# Patient Record
Sex: MALE | Race: WHITE | Employment: FULL TIME | URBAN - METROPOLITAN AREA
[De-identification: names, ages, dates, MRNs, and addresses within clinical notes are randomized per-mention and may not be internally consistent; named-entity substitution may affect disease eponyms.]

---

## 2017-01-17 ENCOUNTER — HOSPITAL ENCOUNTER (OUTPATIENT)
Dept: LAB | Age: 27
Discharge: HOME | End: 2017-01-17

## 2017-01-17 PROCEDURE — 88305 TISSUE EXAM BY PATHOLOGIST: CPT | Performed by: OTOLARYNGOLOGY

## 2017-01-18 ENCOUNTER — ANESTHESIA (OUTPATIENT)
Dept: SURGERY | Age: 27
End: 2017-01-18
Payer: COMMERCIAL

## 2017-01-18 ENCOUNTER — HOSPITAL ENCOUNTER (OUTPATIENT)
Age: 27
Setting detail: OUTPATIENT SURGERY
Discharge: HOME | End: 2017-01-18
Attending: OTOLARYNGOLOGY | Admitting: OTOLARYNGOLOGY
Payer: COMMERCIAL

## 2017-01-18 ENCOUNTER — ANESTHESIA EVENT (OUTPATIENT)
Dept: SURGERY | Age: 27
End: 2017-01-18
Payer: COMMERCIAL

## 2017-01-18 VITALS
OXYGEN SATURATION: 92 % | BODY MASS INDEX: 34.34 KG/M2 | HEART RATE: 111 BPM | TEMPERATURE: 97.9 F | SYSTOLIC BLOOD PRESSURE: 128 MMHG | DIASTOLIC BLOOD PRESSURE: 66 MMHG | HEIGHT: 68 IN | RESPIRATION RATE: 16 BRPM | WEIGHT: 226.56 LBS

## 2017-01-18 LAB — HGB BLD-MCNC: 13.2 G/DL (ref 13.6–17.2)

## 2017-01-18 PROCEDURE — 74011000250 HC RX REV CODE- 250

## 2017-01-18 PROCEDURE — 76060000032 HC ANESTHESIA 0.5 TO 1 HR: Performed by: OTOLARYNGOLOGY

## 2017-01-18 PROCEDURE — 74011250636 HC RX REV CODE- 250/636: Performed by: ANESTHESIOLOGY

## 2017-01-18 PROCEDURE — 74011250636 HC RX REV CODE- 250/636

## 2017-01-18 PROCEDURE — 76210000020 HC REC RM PH II FIRST 0.5 HR: Performed by: OTOLARYNGOLOGY

## 2017-01-18 PROCEDURE — 77030008703 HC TU ET UNCUF COVD -A: Performed by: ANESTHESIOLOGY

## 2017-01-18 PROCEDURE — 76210000016 HC OR PH I REC 1 TO 1.5 HR: Performed by: OTOLARYNGOLOGY

## 2017-01-18 PROCEDURE — 85018 HEMOGLOBIN: CPT | Performed by: ANESTHESIOLOGY

## 2017-01-18 PROCEDURE — 76010000160 HC OR TIME 0.5 TO 1 HR INTENSV-TIER 1: Performed by: OTOLARYNGOLOGY

## 2017-01-18 PROCEDURE — 74011000250 HC RX REV CODE- 250: Performed by: ANESTHESIOLOGY

## 2017-01-18 PROCEDURE — 77030012840 HC ELECTRD COAG SUC CNMD -C: Performed by: OTOLARYNGOLOGY

## 2017-01-18 PROCEDURE — 77030008477 HC STYL SATN SLP COVD -A: Performed by: ANESTHESIOLOGY

## 2017-01-18 PROCEDURE — 77030008771 HC TU NG SALEM SUMP -A: Performed by: ANESTHESIOLOGY

## 2017-01-18 RX ORDER — METOPROLOL TARTRATE 5 MG/5ML
INJECTION INTRAVENOUS AS NEEDED
Status: DISCONTINUED | OUTPATIENT
Start: 2017-01-18 | End: 2017-01-18 | Stop reason: HOSPADM

## 2017-01-18 RX ORDER — FENTANYL CITRATE 50 UG/ML
INJECTION, SOLUTION INTRAMUSCULAR; INTRAVENOUS AS NEEDED
Status: DISCONTINUED | OUTPATIENT
Start: 2017-01-18 | End: 2017-01-18 | Stop reason: HOSPADM

## 2017-01-18 RX ORDER — HYDROMORPHONE HYDROCHLORIDE 2 MG/ML
0.5 INJECTION, SOLUTION INTRAMUSCULAR; INTRAVENOUS; SUBCUTANEOUS
Status: DISCONTINUED | OUTPATIENT
Start: 2017-01-18 | End: 2017-01-18 | Stop reason: HOSPADM

## 2017-01-18 RX ORDER — OXYCODONE HYDROCHLORIDE 5 MG/1
5 TABLET ORAL
Status: DISCONTINUED | OUTPATIENT
Start: 2017-01-18 | End: 2017-01-18 | Stop reason: HOSPADM

## 2017-01-18 RX ORDER — NALOXONE HYDROCHLORIDE 0.4 MG/ML
0.1 INJECTION, SOLUTION INTRAMUSCULAR; INTRAVENOUS; SUBCUTANEOUS
Status: DISCONTINUED | OUTPATIENT
Start: 2017-01-18 | End: 2017-01-18 | Stop reason: HOSPADM

## 2017-01-18 RX ORDER — ROCURONIUM BROMIDE 10 MG/ML
INJECTION, SOLUTION INTRAVENOUS AS NEEDED
Status: DISCONTINUED | OUTPATIENT
Start: 2017-01-18 | End: 2017-01-18 | Stop reason: HOSPADM

## 2017-01-18 RX ORDER — DEXAMETHASONE SODIUM PHOSPHATE 4 MG/ML
INJECTION, SOLUTION INTRA-ARTICULAR; INTRALESIONAL; INTRAMUSCULAR; INTRAVENOUS; SOFT TISSUE AS NEEDED
Status: DISCONTINUED | OUTPATIENT
Start: 2017-01-18 | End: 2017-01-18 | Stop reason: HOSPADM

## 2017-01-18 RX ORDER — SUCCINYLCHOLINE CHLORIDE 20 MG/ML
INJECTION INTRAMUSCULAR; INTRAVENOUS AS NEEDED
Status: DISCONTINUED | OUTPATIENT
Start: 2017-01-18 | End: 2017-01-18 | Stop reason: HOSPADM

## 2017-01-18 RX ORDER — SODIUM CHLORIDE, SODIUM LACTATE, POTASSIUM CHLORIDE, CALCIUM CHLORIDE 600; 310; 30; 20 MG/100ML; MG/100ML; MG/100ML; MG/100ML
75 INJECTION, SOLUTION INTRAVENOUS CONTINUOUS
Status: DISCONTINUED | OUTPATIENT
Start: 2017-01-18 | End: 2017-01-18 | Stop reason: HOSPADM

## 2017-01-18 RX ORDER — DIPHENHYDRAMINE HYDROCHLORIDE 50 MG/ML
12.5 INJECTION, SOLUTION INTRAMUSCULAR; INTRAVENOUS
Status: DISCONTINUED | OUTPATIENT
Start: 2017-01-18 | End: 2017-01-18 | Stop reason: HOSPADM

## 2017-01-18 RX ORDER — PROPOFOL 10 MG/ML
INJECTION, EMULSION INTRAVENOUS AS NEEDED
Status: DISCONTINUED | OUTPATIENT
Start: 2017-01-18 | End: 2017-01-18 | Stop reason: HOSPADM

## 2017-01-18 RX ORDER — FLUMAZENIL 0.1 MG/ML
0.2 INJECTION INTRAVENOUS AS NEEDED
Status: DISCONTINUED | OUTPATIENT
Start: 2017-01-18 | End: 2017-01-18 | Stop reason: HOSPADM

## 2017-01-18 RX ORDER — FAMOTIDINE 10 MG/ML
20 INJECTION INTRAVENOUS ONCE
Status: COMPLETED | OUTPATIENT
Start: 2017-01-18 | End: 2017-01-18

## 2017-01-18 RX ORDER — MIDAZOLAM HYDROCHLORIDE 1 MG/ML
2 INJECTION, SOLUTION INTRAMUSCULAR; INTRAVENOUS
Status: COMPLETED | OUTPATIENT
Start: 2017-01-18 | End: 2017-01-18

## 2017-01-18 RX ORDER — OXYCODONE HYDROCHLORIDE 5 MG/1
10 TABLET ORAL
Status: DISCONTINUED | OUTPATIENT
Start: 2017-01-18 | End: 2017-01-18 | Stop reason: HOSPADM

## 2017-01-18 RX ORDER — LIDOCAINE HYDROCHLORIDE 20 MG/ML
INJECTION, SOLUTION EPIDURAL; INFILTRATION; INTRACAUDAL; PERINEURAL AS NEEDED
Status: DISCONTINUED | OUTPATIENT
Start: 2017-01-18 | End: 2017-01-18 | Stop reason: HOSPADM

## 2017-01-18 RX ORDER — ESMOLOL HYDROCHLORIDE 10 MG/ML
INJECTION INTRAVENOUS AS NEEDED
Status: DISCONTINUED | OUTPATIENT
Start: 2017-01-18 | End: 2017-01-18 | Stop reason: HOSPADM

## 2017-01-18 RX ORDER — LIDOCAINE HYDROCHLORIDE 10 MG/ML
0.1 INJECTION INFILTRATION; PERINEURAL AS NEEDED
Status: DISCONTINUED | OUTPATIENT
Start: 2017-01-18 | End: 2017-01-18 | Stop reason: HOSPADM

## 2017-01-18 RX ADMIN — METOPROLOL TARTRATE 3 MG: 5 INJECTION INTRAVENOUS at 08:42

## 2017-01-18 RX ADMIN — ESMOLOL HYDROCHLORIDE 10 MG: 10 INJECTION INTRAVENOUS at 08:25

## 2017-01-18 RX ADMIN — FENTANYL CITRATE 50 MCG: 50 INJECTION, SOLUTION INTRAMUSCULAR; INTRAVENOUS at 08:15

## 2017-01-18 RX ADMIN — MIDAZOLAM HYDROCHLORIDE 2 MG: 1 INJECTION, SOLUTION INTRAMUSCULAR; INTRAVENOUS at 07:59

## 2017-01-18 RX ADMIN — SODIUM CHLORIDE, SODIUM LACTATE, POTASSIUM CHLORIDE, AND CALCIUM CHLORIDE: 600; 310; 30; 20 INJECTION, SOLUTION INTRAVENOUS at 08:28

## 2017-01-18 RX ADMIN — LIDOCAINE HYDROCHLORIDE 0.1 ML: 10 INJECTION, SOLUTION INFILTRATION; PERINEURAL at 07:46

## 2017-01-18 RX ADMIN — PROPOFOL 100 MG: 10 INJECTION, EMULSION INTRAVENOUS at 08:15

## 2017-01-18 RX ADMIN — METOPROLOL TARTRATE 2 MG: 5 INJECTION INTRAVENOUS at 08:48

## 2017-01-18 RX ADMIN — HYDROMORPHONE HYDROCHLORIDE 0.5 MG: 2 INJECTION, SOLUTION INTRAMUSCULAR; INTRAVENOUS; SUBCUTANEOUS at 09:14

## 2017-01-18 RX ADMIN — SODIUM CHLORIDE, SODIUM LACTATE, POTASSIUM CHLORIDE, AND CALCIUM CHLORIDE 1000 ML: 600; 310; 30; 20 INJECTION, SOLUTION INTRAVENOUS at 07:46

## 2017-01-18 RX ADMIN — DEXAMETHASONE SODIUM PHOSPHATE 10 MG: 4 INJECTION, SOLUTION INTRA-ARTICULAR; INTRALESIONAL; INTRAMUSCULAR; INTRAVENOUS; SOFT TISSUE at 08:10

## 2017-01-18 RX ADMIN — ROCURONIUM BROMIDE 5 MG: 10 INJECTION, SOLUTION INTRAVENOUS at 08:09

## 2017-01-18 RX ADMIN — ESMOLOL HYDROCHLORIDE 20 MG: 10 INJECTION INTRAVENOUS at 08:24

## 2017-01-18 RX ADMIN — FAMOTIDINE 20 MG: 10 INJECTION, SOLUTION INTRAVENOUS at 07:58

## 2017-01-18 RX ADMIN — LIDOCAINE HYDROCHLORIDE 100 MG: 20 INJECTION, SOLUTION EPIDURAL; INFILTRATION; INTRACAUDAL; PERINEURAL at 08:09

## 2017-01-18 RX ADMIN — HYDROMORPHONE HYDROCHLORIDE 0.5 MG: 2 INJECTION, SOLUTION INTRAMUSCULAR; INTRAVENOUS; SUBCUTANEOUS at 09:05

## 2017-01-18 RX ADMIN — SODIUM CHLORIDE, SODIUM LACTATE, POTASSIUM CHLORIDE, AND CALCIUM CHLORIDE: 600; 310; 30; 20 INJECTION, SOLUTION INTRAVENOUS at 08:00

## 2017-01-18 RX ADMIN — FENTANYL CITRATE 100 MCG: 50 INJECTION, SOLUTION INTRAMUSCULAR; INTRAVENOUS at 08:05

## 2017-01-18 RX ADMIN — SUCCINYLCHOLINE CHLORIDE 180 MG: 20 INJECTION INTRAMUSCULAR; INTRAVENOUS at 08:09

## 2017-01-18 RX ADMIN — PROPOFOL 200 MG: 10 INJECTION, EMULSION INTRAVENOUS at 08:09

## 2017-01-18 NOTE — H&P
27 yo male pt of Dr. Shaka Oakley who underwent tonsillectomy at [facility] yesterday. Did well until this morning when he started coughing and there was perfuse bleeding afterwards. They called me around 0630 and I directed them directly to [facility] for control of tonsil bleed. Past Medical History   Diagnosis Date    FH: CAD (coronary artery disease)     GERD (gastroesophageal reflux disease)     HTN (hypertension) 10/31/2014    Hypercholesteremia     Hypertension     Sinus problem      PSurgHxL Tonsillectomy    Social History     Social History    Marital status:      Spouse name: N/A    Number of children: N/A    Years of education: N/A     Occupational History    Not on file. Social History Main Topics    Smoking status: Never Smoker    Smokeless tobacco: Never Used    Alcohol use No    Drug use: No    Sexual activity: Not on file     Other Topics Concern    Not on file     Social History Narrative     Current Facility-Administered Medications   Medication Dose Route Frequency    lidocaine (XYLOCAINE) 10 mg/mL (1 %) injection 0.1 mL  0.1 mL SubCUTAneous PRN    lactated ringers infusion  75 mL/hr IntraVENous CONTINUOUS    midazolam (VERSED) injection 2 mg  2 mg IntraVENous ONCE PRN    lactated ringers bolus infusion 1,000 mL  1,000 mL IntraVENous ONCE    famotidine (PF) (PEPCID) injection 20 mg  20 mg IntraVENous ONCE     EXAM:  Visit Vitals    BP (!) 132/3 (BP 1 Location: Right arm, BP Patient Position: At rest;Supine)    Pulse (!) 104    Temp 97.6 °F (36.4 °C)    Resp 16    Ht 5' 8\" (1.727 m)    Wt 102.8 kg (226 lb 9 oz)    SpO2 96%    BMI 34.45 kg/m2     -NAD, well-appearing  -Normal gait  -Neck soft, supple, no LAD, no neck masses  -Blood clot sitting in back of oropharynx  -No stridor/wheezing    A/P: Acute tonsillar hemorrhage s/p tonsillectomy yesterday- recommend taking him back urgently to OR for control of this tonsillar bleed.  Discussed all risks of surgery and they would like to proceed.      Sam Shahid MD

## 2017-01-18 NOTE — IP AVS SNAPSHOT
Current Discharge Medication List  
  
Take these medications at their scheduled times Dose & Instructions Dispensing Information Comments Morning Noon Evening Bedtime CLARITIN 10 mg tablet Generic drug:  loratadine Your next dose is: Today, Tomorrow Other:  ____________ Dose:  10 mg Take 10 mg by mouth daily. Refills:  0  
     
   
   
   
  
 lisinopril-hydroCHLOROthiazide 20-12.5 mg per tablet Commonly known as:  Fertile Risen Your next dose is: Today, Tomorrow Other:  ____________ Dose:  1 Tab Take 1 Tab by mouth daily. Quantity:  90 Tab Refills:  1  
     
   
   
   
  
 methylPREDNISolone 4 mg tablet Commonly known as:  MEDROL (RENU) Your next dose is: Today, Tomorrow Other:  ____________ Dose:  4 mg Take 1 Tab by mouth Specific Days and Specific Times. Take 1 tablet every day by mouth in the morning after breakfast until pack is completed. Quantity:  1 Dose Pack Refills:  0  
     
   
   
   
  
 omeprazole 40 mg capsule Commonly known as:  PRILOSEC Your next dose is: Today, Tomorrow Other:  ____________ Dose:  40 mg Take 1 Cap by mouth daily. Quantity:  90 Cap Refills:  1  
     
   
   
   
  
 predniSONE 20 mg tablet Commonly known as:  Yulia Chill Your next dose is: Today, Tomorrow Other:  ____________ Dose:  20 mg Take 1 Tab by mouth daily (with breakfast). Quantity:  21 Tab Refills:  0 Take these medications as needed Dose & Instructions Dispensing Information Comments Morning Noon Evening Bedtime * ondansetron 4 mg disintegrating tablet Commonly known as:  ZOFRAN ODT Your next dose is: Today, Tomorrow Other:  ____________ Dose:  4 mg Take 1 Tab by mouth every eight (8) hours as needed for Nausea. Quantity:  20 Tab Refills:  0 oxyCODONE-acetaminophen 5-325 mg per tablet Commonly known as:  PERCOCET Your next dose is: Today, Tomorrow Other:  ____________ Dose:  1 Tab Take 1 Tab by mouth every four (4) hours as needed for Pain. Max Daily Amount: 6 Tabs. Quantity:  10 Tab Refills:  0  
     
   
   
   
  
 * Notice: This list has 1 medication(s) that are the same as other medications prescribed for you. Read the directions carefully, and ask your doctor or other care provider to review them with you. Take these medications as directed Dose & Instructions Dispensing Information Comments Morning Noon Evening Bedtime HYDROcodone-acetaminophen 0.5-21.7 mg/mL oral solution Commonly known as:  HYCET Your next dose is: Today, Tomorrow Other:  ____________  
   
   
 2-4 tsp q 4-6 hours prn pain mild to moderate  Indications: Dispense 8 ounces Quantity:  8 Bottle Refills:  0  
     
   
   
   
  
 * ondansetron 8 mg disintegrating tablet Commonly known as:  ZOFRAN ODT Your next dose is: Today, Tomorrow Other:  ____________  
   
   
 1 q 4-6 hour prn for po nausea  Indications: PREVENTION OF POST-OPERATIVE NAUSEA AND VOMITING Quantity:  8 Tab Refills:  0  
     
   
   
   
  
 oxyCODONE 5 mg/5 mL solution Commonly known as:  Radha Canal Your next dose is: Today, Tomorrow Other:  ____________  
   
   
 1-2 tsp q 4-6 hours prn severe po pain  Indications: dispence 8 ounces Quantity:  8 Bottle Refills:  0  
     
   
   
   
  
 * Notice: This list has 1 medication(s) that are the same as other medications prescribed for you. Read the directions carefully, and ask your doctor or other care provider to review them with you.

## 2017-01-18 NOTE — DISCHARGE INSTRUCTIONS
Tonsillectomy: What to Expect at Home  Your Recovery  A tonsillectomy is surgery to remove the tonsils. Sometimes the adenoids are removed during the same surgery. The tonsils and adenoids are in the throat. Your doctor did the surgery through your mouth. Most adults have a lot of throat pain for 1 to 2 weeks or longer. The pain may get worse before it gets better. The pain in your throat can also make your ears hurt. You may have good days and bad days. Most people find that they have the most pain in the first 8 days. You probably will feel tired for 1 to 2 weeks. You may have bad breath for up to 2 weeks. You may be able to go back to work or your usual routine in 1 to 2 weeks. There will be a white coating in your throat where the tonsils were. The coating is like a scab, and it usually starts to come off in 5 to 10 days. It is usually gone in 10 to 16 days. You may see some blood in your spit as the coating comes off. After surgery, you may snore or breathe through your mouth at night. This usually gets better 1 to 2 weeks after surgery. Mouth breathing can make your mouth and throat dry or sore. Place a humidifier by your bed when you sleep. This may make it easier for you to breathe. Follow the directions for cleaning the machine. At first, your voice may sound different. Your voice probably will return to normal in 2 to 6 weeks. It is common for people to lose weight after this surgery, because it can hurt to swallow food at first. As long as you are drinking plenty of liquids, this is okay. You will probably gain the weight back when you begin to eat normally again. This care sheet gives you a general idea about how long it will take for you to recover. But each person recovers at a different pace. Follow the steps below to get better as quickly as possible. How can you care for yourself at home? Activity  · Rest when you feel tired. Getting enough sleep will help you recover.   · Try to walk each day. Start by walking a little more than you did the day before. Bit by bit, increase the amount you walk. Walking boosts blood flow and helps prevent pneumonia and constipation. · Avoid strenuous activities, such as bicycle riding, jogging, weight lifting, or aerobic exercise, for 2 weeks or until your doctor says it is okay. · For 2 weeks, avoid lifting anything that would make you strain. This may include a child, heavy grocery bags and milk containers, a heavy briefcase or backpack, cat litter or dog food bags, or a vacuum . · Avoid dirt, dust, and heat for 2 weeks after surgery. These things can irritate your throat. · For about 1 week, try to avoid crowds or people who you know have a cold or the flu. This can help prevent you from getting an infection. · You may bathe as usual.  · Ask your doctor when you can drive again. · You will probably need to take 1 to 2 weeks off from work. It depends on the type of work you do and how you feel. Diet  · Drink plenty of fluids to avoid becoming dehydrated. · If it is painful to swallow, start out with Popsicles, ice cream, or cold or room-temperature drinks. Do not eat or drink red food items, such as red juice or red Jell-O. The color may make you think you are bleeding. Avoid hot drinks, soda pop, orange or tomato juice, and other acidic foods that can sting the throat. These may make throat pain worse and cause bleeding. · For 2 weeks, choose soft foods like pudding, yogurt, canned or cooked fruit, scrambled eggs, and mashed potatoes. Avoid eating hard or scratchy foods like chips or raw vegetables. · You may notice that your bowel movements are not regular right after your surgery. This is common. Try to avoid constipation and straining with bowel movements. You may want to take a fiber supplement every day. If you have not had a bowel movement after a couple of days, ask your doctor about taking a mild laxative.   Medicines  · Your doctor will tell you if and when you can restart your medicines. He or she will also give you instructions about taking any new medicines. · If you take blood thinners, such as warfarin (Coumadin), clopidogrel (Plavix), or aspirin, be sure to talk to your doctor. He or she will tell you if and when to start taking those medicines again. Make sure that you understand exactly what your doctor wants you to do. · Be safe with medicines. Take pain medicines exactly as directed. ¨ If the doctor gave you a prescription medicine for pain, take it as prescribed. ¨ If you are not taking a prescription pain medicine, ask your doctor if you can take an over-the-counter medicine. · If you think your pain medicine is making you sick to your stomach:  ¨ Take your pain medicine after meals (unless your doctor has told you not to). ¨ Ask your doctor for a different pain medicine. · If your doctor prescribed antibiotics, take them as directed. Do not stop taking them just because you feel better. You need to take the full course of antibiotics. Follow-up care is a key part of your treatment and safety. Be sure to make and go to all appointments, and call your doctor if you are having problems. It's also a good idea to know your test results and keep a list of the medicines you take. When should you call for help? Call 911 anytime you think you may need emergency care. For example, call if:  · You passed out (lost consciousness). · You have severe trouble breathing. Call your doctor now or seek immediate medical care if:  · You bleed from your mouth or nose. · You vomit blood. · You are sick to your stomach or cannot keep fluids down. · You have pain that does not get better after you take pain medicine. · You have a fever. · Your pain gets much worse. · You have signs of needing more fluids. You have sunken eyes and a dry mouth, and you pass only a little dark urine.   Watch closely for changes in your health, and be sure to contact your doctor if you have any problems. Where can you learn more? Go to http://anneliese-sadia.info/. Enter N114 in the search box to learn more about \"Tonsillectomy: What to Expect at Home. \"  Current as of: July 29, 2016  Content Version: 11.1  © 4414-0631 [employer]. Care instructions adapted under license by [employer] (which disclaims liability or warranty for this information). If you have questions about a medical condition or this instruction, always ask your healthcare professional. [address] any warranty or liability for your use of this information.

## 2017-01-18 NOTE — IP AVS SNAPSHOT
[address] 
[phone] Patient: Flip Pacheco. MRN: [id number] You are allergic to the following No active allergies Recent Documentation Height Weight BMI Smoking Status 1.727 m 102.8 kg 34.45 kg/m2 Never Smoker Emergency Contacts Name Discharge Info Relation Home Work Mobile [address] CAREGIVER [3] Spouse [3] [phone] Dameon Camacho CAREGIVER [3] Father [15] [phone] About your hospitalization You were admitted on:  January 18, 2017 You last received care in the:  [facility] PACU You were discharged on:  January 18, 2017 Unit phone number:  [phone] Why you were hospitalized Your primary diagnosis was:  Not on File Providers Seen During Your Hospitalizations Provider Role Specialty Primary office phone Leopold Real, MD Attending Provider Otolaryngology [phone] Your Primary Care Physician (PCP) Primary Care Physician Office Phone Office Fax Melissa Penaloza 65 [phone] Follow-up Information Follow up With Details Comments Contact Info Yaa Jennings NP   [address] 
[phone] Leopold Real, MD Schedule an appointment as soon as possible for a visit in 1 week(s)  [address] [address] 
[phone] Your Appointments Wednesday January 25, 2017  4:05 PM EST  
POST OP with A Waqas Mondragon MD  
[facility] [address] [place] [address] - [facility]) [address] [place] [address]  
[phone] Thursday February 16, 2017  8:50 AM EST Specimen with Reginald Tomlinson MD  
[facility] HT (U [facility]) [address] [place] [address] Jocelin Boyce  
[phone] Current Discharge Medication List  
  
CONTINUE these medications which have NOT CHANGED Dose & Instructions Dispensing Information Comments Morning Noon Evening Bedtime CLARITIN 10 mg tablet Generic drug:  loratadine Your next dose is: Today, Tomorrow Other:  _________ Dose:  10 mg Take 10 mg by mouth daily. Refills:  0 HYDROcodone-acetaminophen 0.5-21.7 mg/mL oral solution Commonly known as:  HYCET Your next dose is: Today, Tomorrow Other:  _________  
   
   
 2-4 tsp q 4-6 hours prn pain mild to moderate  Indications: Dispense 8 ounces Quantity:  8 Bottle Refills:  0  
     
   
   
   
  
 lisinopril-hydroCHLOROthiazide 20-12.5 mg per tablet Commonly known as:  Freedom Adan Your next dose is: Today, Tomorrow Other:  _________ Dose:  1 Tab Take 1 Tab by mouth daily. Quantity:  90 Tab Refills:  1  
     
   
   
   
  
 methylPREDNISolone 4 mg tablet Commonly known as:  MEDROL (RENU) Your next dose is: Today, Tomorrow Other:  _________ Dose:  4 mg Take 1 Tab by mouth Specific Days and Specific Times. Take 1 tablet every day by mouth in the morning after breakfast until pack is completed. Quantity:  1 Dose Pack Refills:  0  
     
   
   
   
  
 omeprazole 40 mg capsule Commonly known as:  PRILOSEC Your next dose is: Today, Tomorrow Other:  _________ Dose:  40 mg Take 1 Cap by mouth daily. Quantity:  90 Cap Refills:  1  
     
   
   
   
  
 * ondansetron 4 mg disintegrating tablet Commonly known as:  ZOFRAN ODT Your next dose is: Today, Tomorrow Other:  _________ Dose:  4 mg Take 1 Tab by mouth every eight (8) hours as needed for Nausea. Quantity:  20 Tab Refills:  0  
     
   
   
   
  
 * ondansetron 8 mg disintegrating tablet Commonly known as:  ZOFRAN ODT  
   
 Your next dose is: Today, Tomorrow Other:  _________  
   
   
 1 q 4-6 hour prn for po nausea  Indications: PREVENTION OF POST-OPERATIVE NAUSEA AND VOMITING Quantity:  8 Tab Refills:  0  
     
   
   
   
  
 oxyCODONE 5 mg/5 mL solution Commonly known as:  Lynkirit Melissaen Your next dose is: Today, Tomorrow Other:  _________  
   
   
 1-2 tsp q 4-6 hours prn severe po pain  Indications: dispence 8 ounces Quantity:  8 Bottle Refills:  0  
     
   
   
   
  
 oxyCODONE-acetaminophen 5-325 mg per tablet Commonly known as:  PERCOCET Your next dose is: Today, Tomorrow Other:  _________ Dose:  1 Tab Take 1 Tab by mouth every four (4) hours as needed for Pain. Max Daily Amount: 6 Tabs. Quantity:  10 Tab Refills:  0  
     
   
   
   
  
 predniSONE 20 mg tablet Commonly known as:  Nanine Knife Your next dose is: Today, Tomorrow Other:  _________ Dose:  20 mg Take 1 Tab by mouth daily (with breakfast). Quantity:  21 Tab Refills:  0  
     
   
   
   
  
 * Notice: This list has 2 medication(s) that are the same as other medications prescribed for you. Read the directions carefully, and ask your doctor or other care provider to review them with you. Discharge Instructions Tonsillectomy: What to Expect at [facility] Your Recovery A tonsillectomy is surgery to remove the tonsils. Sometimes the adenoids are removed during the same surgery. The tonsils and adenoids are in the throat. Your doctor did the surgery through your mouth. Most adults have a lot of throat pain for 1 to 2 weeks or longer. The pain may get worse before it gets better. The pain in your throat can also make your ears hurt. You may have good days and bad days. Most people find that they have the most pain in the first 8 days. You probably will feel tired for 1 to 2 weeks. You may have bad breath for up to 2 weeks. You may be able to go back to work or your usual routine in 1 to 2 weeks. There will be a white coating in your throat where the tonsils were. The coating is like a scab, and it usually starts to come off in 5 to 10 days. It is usually gone in 10 to 16 days. You may see some blood in your spit as the coating comes off. After surgery, you may snore or breathe through your mouth at night. This usually gets better 1 to 2 weeks after surgery. Mouth breathing can make your mouth and throat dry or sore. Place a humidifier by your bed when you sleep. This may make it easier for you to breathe. Follow the directions for cleaning the machine. At first, your voice may sound different. Your voice probably will return to normal in 2 to 6 weeks. It is common for people to lose weight after this surgery, because it can hurt to swallow food at first. As long as you are drinking plenty of liquids, this is okay. You will probably gain the weight back when you begin to eat normally again. This care sheet gives you a general idea about how long it will take for you to recover. But each person recovers at a different pace. Follow the steps below to get better as quickly as possible. How can you care for yourself at home? Activity · Rest when you feel tired. Getting enough sleep will help you recover. · Try to walk each day. Start by walking a little more than you did the day before. Bit by bit, increase the amount you walk. Walking boosts blood flow and helps prevent pneumonia and constipation. · Avoid strenuous activities, such as bicycle riding, jogging, weight lifting, or aerobic exercise, for 2 weeks or until your doctor says it is okay. · For 2 weeks, avoid lifting anything that would make you strain. This may include a child, heavy grocery bags and milk containers, a heavy briefcase or backpack, cat litter or dog food bags, or a vacuum . · Avoid dirt, dust, and heat for 2 weeks after surgery.  These things can irritate your throat. · For about 1 week, try to avoid crowds or people who you know have a cold or the flu. This can help prevent you from getting an infection. · You may bathe as usual. 
· Ask your doctor when you can drive again. · You will probably need to take 1 to 2 weeks off from work. It depends on the type of work you do and how you feel. Diet · Drink plenty of fluids to avoid becoming dehydrated. · If it is painful to swallow, start out with Popsicles, ice cream, or cold or room-temperature drinks. Do not eat or drink red food items, such as red juice or red Jell-O. The color may make you think you are bleeding. Avoid hot drinks, soda pop, orange or tomato juice, and other acidic foods that can sting the throat. These may make throat pain worse and cause bleeding. · For 2 weeks, choose soft foods like pudding, yogurt, canned or cooked fruit, scrambled eggs, and mashed potatoes. Avoid eating hard or scratchy foods like chips or raw vegetables. · You may notice that your bowel movements are not regular right after your surgery. This is common. Try to avoid constipation and straining with bowel movements. You may want to take a fiber supplement every day. If you have not had a bowel movement after a couple of days, ask your doctor about taking a mild laxative. Medicines · Your doctor will tell you if and when you can restart your medicines. He or she will also give you instructions about taking any new medicines. · If you take blood thinners, such as warfarin (Coumadin), clopidogrel (Plavix), or aspirin, be sure to talk to your doctor. He or she will tell you if and when to start taking those medicines again. Make sure that you understand exactly what your doctor wants you to do. · Be safe with medicines. Take pain medicines exactly as directed. ¨ If the doctor gave you a prescription medicine for pain, take it as prescribed. ¨ If you are not taking a prescription pain medicine, ask your doctor if you can take an over-the-counter medicine. · If you think your pain medicine is making you sick to your stomach: 
¨ Take your pain medicine after meals (unless your doctor has told you not to). ¨ Ask your doctor for a different pain medicine. · If your doctor prescribed antibiotics, take them as directed. Do not stop taking them just because you feel better. You need to take the full course of antibiotics. Follow-up care is a key part of your treatment and safety. Be sure to make and go to all appointments, and call your doctor if you are having problems. It's also a good idea to know your test results and keep a list of the medicines you take. When should you call for help? Call 911 anytime you think you may need emergency care. For example, call if: 
· You passed out (lost consciousness). · You have severe trouble breathing. Call your doctor now or seek immediate medical care if: 
· You bleed from your mouth or nose. · You vomit blood. · You are sick to your stomach or cannot keep fluids down. · You have pain that does not get better after you take pain medicine. · You have a fever. · Your pain gets much worse. · You have signs of needing more fluids. You have sunken eyes and a dry mouth, and you pass only a little dark urine. Watch closely for changes in your health, and be sure to contact your doctor if you have any problems. Where can you learn more? Go to http://anneliese-sadia.info/. Enter A440 in the search box to learn more about \"Tonsillectomy: What to Expect at Home. \" Current as of: July 29, 2016 Content Version: 11.1 © 9828-9978 [employer]. Care instructions adapted under license by [employer] (which disclaims liability or warranty for this information).  If you have questions about a medical condition or this instruction, always ask your healthcare professional. [place] [address] any warranty or liability for your use of this information. Discharge Orders None Introducing [facility]! Chely Bunch introduces [employer] patient portal. Now you can access parts of your medical record, email your doctor's office, and request medication refills online. 1. In your internet browser, go to [email or web address] 2. Click on the First Time User? Click Here link in the Sign In box. You will see the New Member Sign Up page. 3. Enter your [employer] Access Code exactly as it appears below. You will not need to use this code after youve completed the sign-up process. If you do not sign up before the expiration date, you must request a new code. · [employer] Access Code: [email or web address] Expires: 4/18/2017  7:25 AM 
 
4. Enter the last four digits of your Social Security Number (xxxx) and Date of Birth (mm/dd/yyyy) as indicated and click Submit. You will be taken to the next sign-up page. 5. Create a [employer] ID. This will be your [employer] login ID and cannot be changed, so think of one that is secure and easy to remember. 6. Create a [employer] password. You can change your password at any time. 7. Enter your Password Reset Question and Answer. This can be used at a later time if you forget your password. 8. Enter your e-mail address. You will receive e-mail notification when new information is available in [address]. 9. Click Sign Up. You can now view and download portions of your medical record. 10. Click the Download Summary menu link to download a portable copy of your medical information. If you have questions, please visit the Frequently Asked Questions section of the [employer] website. Remember, [employer] is NOT to be used for urgent needs. For medical emergencies, dial 911. Now available from your iPhone and Android! General Information Please provide this summary of care documentation to your next provider. Patient Signature:  ____________________________________________________________ Date:  ____________________________________________________________  
  
Gwenyth Miles Provider Signature:  ____________________________________________________________ Date:  ____________________________________________________________

## 2017-01-18 NOTE — ANESTHESIA POSTPROCEDURE EVALUATION
Post-Anesthesia Evaluation and Assessment    Patient: Loida Curry. MRN: [id number]  SSN: [id number]    YOB: 1990  Age: 28 y.o. Sex: male       Cardiovascular Function/Vital Signs  Visit Vitals    /81    Pulse (!) 109    Temp 36.6 °C (97.9 °F)    Resp 16    Ht 5' 8\" (1.727 m)    Wt 102.8 kg (226 lb 9 oz)    SpO2 98%    BMI 34.45 kg/m2       Patient is status post No value filed. anesthesia for Procedure(s):  TONSILLAR HEMORRHAGE. Nausea/Vomiting: None    Postoperative hydration reviewed and adequate. Pain:  Pain Scale 1: Visual (01/18/17 0914)  Pain Intensity 1: 4 (01/18/17 0914)   Managed    Neurological Status:   Neuro (WDL): Exceptions to WDL (01/18/17 0850)  Neuro  Neurologic State: Drowsy; Eyes open to stimulus (01/18/17 0850)  Cognition: Follows commands (01/18/17 0850)  LUE Motor Response: Purposeful (01/18/17 0850)  LLE Motor Response: Purposeful (01/18/17 0850)  RUE Motor Response: Purposeful (01/18/17 0850)  RLE Motor Response: Purposeful (01/18/17 0850)   At baseline    Mental Status and Level of Consciousness: Arousable    Pulmonary Status:   O2 Device: 02 face tent (01/18/17 0920)   Adequate oxygenation and airway patent    Complications related to anesthesia: None    Post-anesthesia assessment completed.  No concerns    Signed By: Marlo Leal MD     January 18, 2017

## 2017-01-18 NOTE — ANESTHESIA PREPROCEDURE EVALUATION
Anesthetic History   No history of anesthetic complications            Review of Systems / Medical History  Patient summary reviewed, nursing notes reviewed and pertinent labs reviewed    Pulmonary  Within defined limits                 Neuro/Psych   Within defined limits           Cardiovascular    Hypertension: well controlled              Exercise tolerance: >4 METS     GI/Hepatic/Renal     GERD: well controlled           Endo/Other        Obesity     Other Findings            Physical Exam    Airway  Mallampati: II  TM Distance: 4 - 6 cm  Neck ROM: normal range of motion   Mouth opening: Normal     Cardiovascular    Rhythm: regular  Rate: normal         Dental  No notable dental hx       Pulmonary  Breath sounds clear to auscultation               Abdominal         Other Findings            Anesthetic Plan    ASA: 2, emergent  Anesthesia type: general            Anesthetic plan and risks discussed with: Patient and Spouse

## 2017-01-18 NOTE — BRIEF OP NOTE
BRIEF OPERATIVE NOTE    Date of Procedure: 1/18/2017   Preoperative Diagnosis: TONSIL BLEED    Postoperative Diagnosis: TONSIL BLEED    Procedure(s):  TONSILLAR HEMORRHAGE    Surgeon(s) and Role:     Prema Moreira MD - Primary    Surgical Staff:  Circ-1: Nasima March RN  Scrub Tech-1: Summer D Mil    Event Time In   Incision Start 5786   Incision Close      Anesthesia: General     Estimated Blood Loss: 40 cc    IVF: 900 cc    Specimens: * No specimens in log *     Findings: profuse bleeding from L inferior pole, R mid-tonsil     Complications: none    Implants: * No implants in log *

## 2017-01-18 NOTE — OP NOTES
[address]   OPERATIVE REPORT       Name:  Ebony Anderson   MR#:  [id number]   :  1990   Account #:  [de-identified]   Date of Adm:  2017       DATE OF SURGERY: 2017    PREOPERATIVE DIAGNOSIS: Acute tonsillar hemorrhage. POSTOPERATIVE DIAGNOSIS: Acute tonsillar hemorrhage. PROCEDURE: Control of tonsillar hemorrhage. OPERATIVE SURGEON: Polo Webber. Mihir Phelps MD.    ANESTHESIA: General endotracheal.    ANESTHESIOLOGIST: MONTSERRAT Eid MD    OPERATIVE FINDINGS: There was brisk bleeding from the right   inferior tonsillar pole as well as some mild bleeding from the   left mid tonsillar fossa. Hemostasis was ensured with suction   Bovie electrocautery and pressure. INTRAVENOUS FLUIDS: 900 mL crystalloid. ESTIMATED BLOOD LOSS:   40 cc    DRAINS: none    COMPLICATIONS: none    DISPOSITION: PACU, then home    INDICATIONS: Mr. Joya Hernandez is a patient of Dr. Kerrie Del Cid who underwent tonsillectomy yesterday at [facility]. He called this morning w/ acute bleeding from his throat and I directed them to go straight to hospital and I took him back urgently for control of this tonsillar bleed. DESCRIPTION OF PROCEDURE:  Patient was brought back to OR and placed on table in supine position. General anesthesia was inducted and then table was turned 90 degrees   counter clockwise. Crow-Braulio mouth-gag was placed. I used a copious sterile saline irrigation and suction to clean   all the blood clot out of the back of his oropharynx. On exam,   there was brisk bleeding coming from the right inferior   tonsillar pole. Pressure was applied from tonsil sponges and a   suction Bovie electrocautery to cauterize a blood vessel which   was bleeding right near the base of tongue. On the left side,   there was also some bleeding from the mid tonsillar fossa, which   was again controlled with suction Bovie electrocautery.  I   irrigated out the oral cavity and then briefly took him out of suspension and massaged his neck for a full 30 seconds to ensure   no further bleeding. Once hemostasis was ensured, I injected a   total of 7 mL of 0.5% Marcaine with 1:200,000 epinephrine along   both anterior and posterior tonsillar pillars. This concluded the surgical portion of the procedure. I   suctioned out the patient's stomach and he was awakened from   anesthesia and taken to the PACU in stable condition afterwards.         Mariama Albright MD      [facility]. / [place]   D:  01/18/2017   08:43   T:  01/18/2017   09:08   Job #:  [id number]

## 2017-06-09 ENCOUNTER — HOSPITAL ENCOUNTER (EMERGENCY)
Age: 27
Discharge: HOME | End: 2017-06-09
Attending: EMERGENCY MEDICINE
Payer: COMMERCIAL

## 2017-06-09 ENCOUNTER — APPOINTMENT (OUTPATIENT)
Dept: CT IMAGING | Age: 27
End: 2017-06-09
Attending: EMERGENCY MEDICINE
Payer: COMMERCIAL

## 2017-06-09 VITALS
SYSTOLIC BLOOD PRESSURE: 129 MMHG | WEIGHT: 220 LBS | TEMPERATURE: 98 F | HEIGHT: 68 IN | DIASTOLIC BLOOD PRESSURE: 79 MMHG | HEART RATE: 89 BPM | OXYGEN SATURATION: 99 % | RESPIRATION RATE: 16 BRPM | BODY MASS INDEX: 33.34 KG/M2

## 2017-06-09 DIAGNOSIS — R10.9 ACUTE ABDOMINAL PAIN: Primary | ICD-10-CM

## 2017-06-09 LAB
ANION GAP BLD CALC-SCNC: 13 MMOL/L (ref 7–16)
BASOPHILS # BLD AUTO: 0 K/UL (ref 0–0.2)
BASOPHILS # BLD: 0 % (ref 0–2)
BUN SERPL-MCNC: 16 MG/DL (ref 6–23)
CALCIUM SERPL-MCNC: 9.1 MG/DL (ref 8.3–10.4)
CHLORIDE SERPL-SCNC: 97 MMOL/L (ref 98–107)
CO2 SERPL-SCNC: 28 MMOL/L (ref 21–32)
CREAT SERPL-MCNC: 0.89 MG/DL (ref 0.8–1.5)
DIFFERENTIAL METHOD BLD: ABNORMAL
EOSINOPHIL # BLD: 0.1 K/UL (ref 0–0.8)
EOSINOPHIL NFR BLD: 1 % (ref 0.5–7.8)
ERYTHROCYTE [DISTWIDTH] IN BLOOD BY AUTOMATED COUNT: 15 % (ref 11.9–14.6)
GLUCOSE SERPL-MCNC: 97 MG/DL (ref 65–100)
HCT VFR BLD AUTO: 42.7 % (ref 41.1–50.3)
HGB BLD-MCNC: 13.7 G/DL (ref 13.6–17.2)
IMM GRANULOCYTES # BLD: 0 K/UL (ref 0–0.5)
IMM GRANULOCYTES NFR BLD AUTO: 0.3 % (ref 0–5)
LIPASE SERPL-CCNC: 100 U/L (ref 73–393)
LYMPHOCYTES # BLD AUTO: 43 % (ref 13–44)
LYMPHOCYTES # BLD: 4 K/UL (ref 0.5–4.6)
MCH RBC QN AUTO: 27.8 PG (ref 26.1–32.9)
MCHC RBC AUTO-ENTMCNC: 32.1 G/DL (ref 31.4–35)
MCV RBC AUTO: 86.8 FL (ref 79.6–97.8)
MONOCYTES # BLD: 0.9 K/UL (ref 0.1–1.3)
MONOCYTES NFR BLD AUTO: 9 % (ref 4–12)
NEUTS SEG # BLD: 4.3 K/UL (ref 1.7–8.2)
NEUTS SEG NFR BLD AUTO: 47 % (ref 43–78)
PLATELET # BLD AUTO: 316 K/UL (ref 150–450)
PMV BLD AUTO: 10.2 FL (ref 10.8–14.1)
POTASSIUM SERPL-SCNC: 3.9 MMOL/L (ref 3.5–5.1)
RBC # BLD AUTO: 4.92 M/UL (ref 4.23–5.67)
SODIUM SERPL-SCNC: 138 MMOL/L (ref 136–145)
WBC # BLD AUTO: 9.4 K/UL (ref 4.3–11.1)

## 2017-06-09 PROCEDURE — 85025 COMPLETE CBC W/AUTO DIFF WBC: CPT | Performed by: EMERGENCY MEDICINE

## 2017-06-09 PROCEDURE — 99283 EMERGENCY DEPT VISIT LOW MDM: CPT | Performed by: EMERGENCY MEDICINE

## 2017-06-09 PROCEDURE — 81003 URINALYSIS AUTO W/O SCOPE: CPT | Performed by: EMERGENCY MEDICINE

## 2017-06-09 PROCEDURE — 80048 BASIC METABOLIC PNL TOTAL CA: CPT | Performed by: EMERGENCY MEDICINE

## 2017-06-09 PROCEDURE — 74176 CT ABD & PELVIS W/O CONTRAST: CPT

## 2017-06-09 PROCEDURE — 83690 ASSAY OF LIPASE: CPT | Performed by: EMERGENCY MEDICINE

## 2017-06-09 NOTE — ED TRIAGE NOTES
Pt reports lower abdominal pain and pain with urination x 1 week. Pt reports nausea. Pt states feels same as when he had a UTI.       Laurin Kawasaki, RN

## 2017-06-10 NOTE — DISCHARGE INSTRUCTIONS

## 2017-06-10 NOTE — ED NOTES
I have reviewed discharge instructions with the patient. The patient verbalized understanding.   Pt ambulated to lobby unassisted and is accompanied by his SO.

## 2017-06-10 NOTE — ED PROVIDER NOTES
Patient is a 28 y.o. male presenting with abdominal pain. The history is provided by the patient and the spouse. Abdominal Pain    This is a new problem. The current episode started more than 2 days ago. The problem occurs daily. The problem has been gradually worsening. The pain is associated with an unknown factor. The pain is located in the LLQ, LUQ and suprapubic region. The quality of the pain is aching, cramping and sharp. The pain is at a severity of 8/10. Associated symptoms include nausea. Pertinent negatives include no anorexia, no fever, no belching, no diarrhea, no flatus, no hematochezia, no melena, no vomiting, no constipation, no dysuria, no frequency, no hematuria, no headaches, no arthralgias, no myalgias, no trauma, no chest pain, no testicular pain and no back pain. Nothing worsens the pain. The pain is relieved by nothing. His past medical history is significant for GERD. His past medical history does not include PUD, gallstones, ulcerative colitis, Crohn's disease, irritable bowel syndrome, cancer, UTI, pancreatitis, diverticulitis, atrial fibrillation, DM, kidney stones or small bowel obstruction. The patient's surgical history non-contributory. Past Medical History:   Diagnosis Date    FH: CAD (coronary artery disease)     GERD (gastroesophageal reflux disease)     HTN (hypertension) 10/31/2014    Hypercholesteremia     Hypertension     Sinus problem        History reviewed. No pertinent surgical history. Family History:   Problem Relation Age of Onset    Hypertension Mother     High Cholesterol Mother     Depression Mother     Substance Abuse Mother     Hypertension Father     Depression Father        Social History     Social History    Marital status:      Spouse name: N/A    Number of children: N/A    Years of education: N/A     Occupational History    Not on file.      Social History Main Topics    Smoking status: Never Smoker    Smokeless tobacco: Never Used    Alcohol use No    Drug use: No    Sexual activity: Not on file     Other Topics Concern    Not on file     Social History Narrative         ALLERGIES: Review of patient's allergies indicates no known allergies. Review of Systems   Constitutional: Negative for chills and fever. Cardiovascular: Negative for chest pain. Gastrointestinal: Positive for abdominal pain and nausea. Negative for anorexia, constipation, diarrhea, flatus, hematochezia, melena and vomiting. Genitourinary: Negative for dysuria, frequency, hematuria and testicular pain. Musculoskeletal: Negative for arthralgias, back pain and myalgias. Neurological: Negative for headaches. All other systems reviewed and are negative. Vitals:    06/09/17 1933   BP: 132/80   Pulse: 93   Resp: 16   Temp: 98.1 °F (36.7 °C)   SpO2: 98%   Weight: 99.8 kg (220 lb)   Height: 5' 8\" (1.727 m)            Physical Exam   Constitutional: He is oriented to person, place, and time. He appears well-developed and well-nourished. He appears distressed (mild). HENT:   Head: Normocephalic and atraumatic. Right Ear: Tympanic membrane and external ear normal.   Left Ear: Tympanic membrane and external ear normal.   Mouth/Throat: Oropharynx is clear and moist.   Eyes: Conjunctivae and EOM are normal. Pupils are equal, round, and reactive to light. Neck: Normal range of motion. Neck supple. No tracheal deviation present. Cardiovascular: Normal rate, regular rhythm, normal heart sounds and intact distal pulses. Exam reveals no gallop and no friction rub. No murmur heard. Pulmonary/Chest: Effort normal and breath sounds normal. No respiratory distress. He has no wheezes. Abdominal: Soft. Bowel sounds are normal. He exhibits no shifting dullness, no distension, no pulsatile liver, no fluid wave, no abdominal bruit, no pulsatile midline mass and no mass. There is no hepatosplenomegaly.  There is tenderness in the suprapubic area, left upper quadrant and left lower quadrant. There is CVA tenderness (mild on left). There is no rigidity, no rebound, no guarding, no tenderness at McBurney's point and negative Santos's sign. No hernia. Musculoskeletal: Normal range of motion. He exhibits no edema. Lymphadenopathy:     He has no cervical adenopathy. Neurological: He is alert and oriented to person, place, and time. He displays normal reflexes. No cranial nerve deficit. Skin: Skin is warm and dry. No rash noted. He is not diaphoretic. No erythema. Psychiatric: He has a normal mood and affect. Nursing note and vitals reviewed.        MDM  Number of Diagnoses or Management Options  Acute abdominal pain: new and requires workup     Amount and/or Complexity of Data Reviewed  Clinical lab tests: ordered and reviewed  Tests in the radiology section of CPT®: ordered and reviewed  Decide to obtain previous medical records or to obtain history from someone other than the patient: yes  Obtain history from someone other than the patient: yes  Review and summarize past medical records: yes    Risk of Complications, Morbidity, and/or Mortality  Presenting problems: high  Diagnostic procedures: high  Management options: moderate    Patient Progress  Patient progress: improved    ED Course       Procedures

## 2017-06-12 ENCOUNTER — PATIENT OUTREACH (OUTPATIENT)
Dept: OTHER | Age: 27
End: 2017-06-12

## 2017-06-26 ENCOUNTER — PATIENT OUTREACH (OUTPATIENT)
Dept: OTHER | Age: 27
End: 2017-06-26

## 2017-06-26 NOTE — PROGRESS NOTES
Patient identified as eligible for [address] [place] [address] services. Second telephone outreach attempted. Left discreet voicemail with this CM confidential contact information. Will send UTR letter.

## 2017-06-26 NOTE — LETTER
6/26/2017 9:06 AM 
 
Mr. Jamar Saldana. 
[address] [employer] [place] [address] Dear Zakia Guevara, My name is Janet Sterling RN, Employee Care Manager for [employer] and I have been trying to reach you. The Employee Care  is a free-of-charge confidential service provided to our employees and their family members covered by the [facility]. The program will provide an employee and his/her family with the [facility] expertise to assist in navigating the health care delivery system, provider services, and their overall care needsso as to assure and improve health care interactions and enhance the quality of life. This program is designed to provide you with the opportunity to have a [facility] care manager partner with you for the following services: 
 
1.) Care transitions-such as when you come home from the hospital 
2.) When help is needed to manage your disease process 3.) When you are faced with managing a chronic or complex medical condition [facility] is dedicated to empowering the good health of its community and improving the quality of care and care experiences for employees and their families. We are commited to safeguarding patient confidentiality and privacy,  assuring that every employee has the respect he or she deserves in managing their health. The information shared with your care manager will not be shared with anyone else aside from those you identify as part of your care team, and will only be used to assist you with any identified care needs. Please contact me if you would like this service provided to you. Sincerely, Janet Sterling RN, MSN 
Care Manager Cell: [phone]

## 2017-11-29 ENCOUNTER — PATIENT OUTREACH (OUTPATIENT)
Dept: OTHER | Age: 27
End: 2017-11-29

## 2017-11-29 NOTE — PROGRESS NOTES
Resolving current episode Transitions of care complete. No further ED/UC or hospital admissions within 30 days post discharge. Patient attended follow-up appointments as directed. No outreach from patient to [address] [place] [address].

## 2017-12-18 ENCOUNTER — HOSPITAL ENCOUNTER (EMERGENCY)
Age: 27
Discharge: HOME | End: 2017-12-18
Attending: EMERGENCY MEDICINE
Payer: COMMERCIAL

## 2017-12-18 VITALS
WEIGHT: 214 LBS | HEIGHT: 68 IN | DIASTOLIC BLOOD PRESSURE: 79 MMHG | BODY MASS INDEX: 32.43 KG/M2 | SYSTOLIC BLOOD PRESSURE: 122 MMHG | OXYGEN SATURATION: 97 % | TEMPERATURE: 99.6 F | HEART RATE: 82 BPM | RESPIRATION RATE: 16 BRPM

## 2017-12-18 DIAGNOSIS — K52.9 GASTROENTERITIS, ACUTE: Primary | ICD-10-CM

## 2017-12-18 LAB
ALBUMIN SERPL-MCNC: 3.6 G/DL (ref 3.5–5)
ALBUMIN/GLOB SERPL: 1 {RATIO} (ref 1.2–3.5)
ALP SERPL-CCNC: 73 U/L (ref 50–136)
ALT SERPL-CCNC: 36 U/L (ref 12–65)
ANION GAP SERPL CALC-SCNC: 10 MMOL/L (ref 7–16)
AST SERPL-CCNC: 22 U/L (ref 15–37)
BILIRUB SERPL-MCNC: 0.5 MG/DL (ref 0.2–1.1)
BUN SERPL-MCNC: 17 MG/DL (ref 6–23)
CALCIUM SERPL-MCNC: 8.6 MG/DL (ref 8.3–10.4)
CHLORIDE SERPL-SCNC: 101 MMOL/L (ref 98–107)
CO2 SERPL-SCNC: 27 MMOL/L (ref 21–32)
CREAT SERPL-MCNC: 0.9 MG/DL (ref 0.8–1.5)
FLUAV AG NPH QL IA: NEGATIVE
FLUBV AG NPH QL IA: NEGATIVE
GLOBULIN SER CALC-MCNC: 3.5 G/DL (ref 2.3–3.5)
GLUCOSE SERPL-MCNC: 102 MG/DL (ref 65–100)
MAGNESIUM SERPL-MCNC: 1.6 MG/DL (ref 1.8–2.4)
POTASSIUM SERPL-SCNC: 3.3 MMOL/L (ref 3.5–5.1)
PROT SERPL-MCNC: 7.1 G/DL (ref 6.3–8.2)
SODIUM SERPL-SCNC: 138 MMOL/L (ref 136–145)

## 2017-12-18 PROCEDURE — 99283 EMERGENCY DEPT VISIT LOW MDM: CPT | Performed by: EMERGENCY MEDICINE

## 2017-12-18 PROCEDURE — 83735 ASSAY OF MAGNESIUM: CPT | Performed by: EMERGENCY MEDICINE

## 2017-12-18 PROCEDURE — 96361 HYDRATE IV INFUSION ADD-ON: CPT | Performed by: EMERGENCY MEDICINE

## 2017-12-18 PROCEDURE — 87804 INFLUENZA ASSAY W/OPTIC: CPT | Performed by: EMERGENCY MEDICINE

## 2017-12-18 PROCEDURE — 74011250636 HC RX REV CODE- 250/636: Performed by: EMERGENCY MEDICINE

## 2017-12-18 PROCEDURE — 96374 THER/PROPH/DIAG INJ IV PUSH: CPT | Performed by: EMERGENCY MEDICINE

## 2017-12-18 PROCEDURE — 80053 COMPREHEN METABOLIC PANEL: CPT | Performed by: EMERGENCY MEDICINE

## 2017-12-18 PROCEDURE — 74011250637 HC RX REV CODE- 250/637: Performed by: EMERGENCY MEDICINE

## 2017-12-18 RX ORDER — DIPHENOXYLATE HYDROCHLORIDE AND ATROPINE SULFATE 2.5; .025 MG/1; MG/1
2 TABLET ORAL
Status: COMPLETED | OUTPATIENT
Start: 2017-12-18 | End: 2017-12-18

## 2017-12-18 RX ORDER — ONDANSETRON 8 MG/1
8 TABLET, ORALLY DISINTEGRATING ORAL
Qty: 12 TAB | Refills: 1 | Status: SHIPPED | OUTPATIENT
Start: 2017-12-18

## 2017-12-18 RX ORDER — DIPHENOXYLATE HYDROCHLORIDE AND ATROPINE SULFATE 2.5; .025 MG/1; MG/1
2 TABLET ORAL
Qty: 20 TAB | Refills: 0 | Status: SHIPPED | OUTPATIENT
Start: 2017-12-18 | End: 2018-06-07

## 2017-12-18 RX ORDER — HYOSCYAMINE SULFATE 0.12 MG/1
0.25 TABLET SUBLINGUAL
Status: COMPLETED | OUTPATIENT
Start: 2017-12-18 | End: 2017-12-18

## 2017-12-18 RX ORDER — HYOSCYAMINE SULFATE 0.12 MG/1
0.25 TABLET SUBLINGUAL
Qty: 20 TAB | Refills: 0 | Status: SHIPPED | OUTPATIENT
Start: 2017-12-18 | End: 2018-06-07

## 2017-12-18 RX ORDER — PROMETHAZINE HYDROCHLORIDE 25 MG/1
25 TABLET ORAL
Qty: 12 TAB | Refills: 0 | Status: SHIPPED | OUTPATIENT
Start: 2017-12-18

## 2017-12-18 RX ORDER — SODIUM CHLORIDE 9 MG/ML
1000 INJECTION, SOLUTION INTRAVENOUS CONTINUOUS
Status: DISCONTINUED | OUTPATIENT
Start: 2017-12-18 | End: 2017-12-19 | Stop reason: HOSPADM

## 2017-12-18 RX ORDER — ONDANSETRON 2 MG/ML
4 INJECTION INTRAMUSCULAR; INTRAVENOUS
Status: COMPLETED | OUTPATIENT
Start: 2017-12-18 | End: 2017-12-18

## 2017-12-18 RX ADMIN — SODIUM CHLORIDE 1000 ML/HR: 900 INJECTION, SOLUTION INTRAVENOUS at 21:13

## 2017-12-18 RX ADMIN — ONDANSETRON 4 MG: 2 INJECTION INTRAMUSCULAR; INTRAVENOUS at 21:48

## 2017-12-18 RX ADMIN — HYOSCYAMINE SULFATE 0.25 MG: 0.12 TABLET ORAL; SUBLINGUAL at 21:49

## 2017-12-18 RX ADMIN — DIPHENOXYLATE HYDROCHLORIDE AND ATROPINE SULFATE 2 TABLET: 2.5; .025 TABLET ORAL at 21:48

## 2017-12-18 NOTE — LETTER
[facility] EMERGENCY DEPT 
[address] [place] [id number] 
[phone] Work/School Note Date: 12/18/2017 To Whom It May concern: 
 
Flip Pacheco. was seen and treated today in the emergency room by the following provider(s): 
Attending Provider: Bryanna Rebollar MD. Flip Escobar may return to work on 12/20/17, please excuse Monday and Tuesday as needed.  
 
Sincerely, 
 
 
 
 
Bryanna Rebollar MD

## 2017-12-19 NOTE — DISCHARGE INSTRUCTIONS
Gastroenteritis: Care Instructions  Your Care Instructions    Gastroenteritis is an illness that may cause nausea, vomiting, and diarrhea. It is sometimes called \"stomach flu. \" It can be caused by bacteria or a virus. You will probably begin to feel better in 1 to 2 days. In the meantime, get plenty of rest and make sure you do not become dehydrated. Dehydration occurs when your body loses too much fluid. Follow-up care is a key part of your treatment and safety. Be sure to make and go to all appointments, and call your doctor if you are having problems. It's also a good idea to know your test results and keep a list of the medicines you take. How can you care for yourself at home? · If your doctor prescribed antibiotics, take them as directed. Do not stop taking them just because you feel better. You need to take the full course of antibiotics. · Drink plenty of fluids to prevent dehydration, enough so that your urine is light yellow or clear like water. Choose water and other caffeine-free clear liquids until you feel better. If you have kidney, heart, or liver disease and have to limit fluids, talk with your doctor before you increase your fluid intake. · Drink fluids slowly, in frequent, small amounts, because drinking too much too fast can cause vomiting. · Begin eating mild foods, such as dry toast, yogurt, applesauce, bananas, and rice. Avoid spicy, hot, or high-fat foods, and do not drink alcohol or caffeine for a day or two. Do not drink milk or eat ice cream until you are feeling better. How to prevent gastroenteritis  · Keep hot foods hot and cold foods cold. · Do not eat meats, dressings, salads, or other foods that have been kept at room temperature for more than 2 hours. · Use a thermometer to check your refrigerator. It should be between 34°F and 40°F.  · Defrost meats in the refrigerator or microwave, not on the kitchen counter. · Keep your hands and your kitchen clean.  Wash your hands, cutting boards, and countertops with hot soapy water frequently. · Cook meat until it is well done. · Do not eat raw eggs or uncooked sauces made with raw eggs. · Do not take chances. If food looks or tastes spoiled, throw it out. When should you call for help? Call 911 anytime you think you may need emergency care. For example, call if:  ? · You vomit blood or what looks like coffee grounds. ? · You passed out (lost consciousness). ? · You pass maroon or very bloody stools. ?Call your doctor now or seek immediate medical care if:  ? · You have severe belly pain. ? · You have signs of needing more fluids. You have sunken eyes, a dry mouth, and pass only a little dark urine. ? · You feel like you are going to faint. ? · You have increased belly pain that does not go away in 1 to 2 days. ? · You have new or increased nausea, or you are vomiting. ? · You have a new or higher fever. ? · Your stools are black and tarlike or have streaks of blood. ? Watch closely for changes in your health, and be sure to contact your doctor if:  ? · You are dizzy or lightheaded. ? · You urinate less than usual, or your urine is dark yellow or brown. ? · You do not feel better with each day that goes by. Where can you learn more? Go to http://anneliese-sadia.info/. Enter N142 in the search box to learn more about \"Gastroenteritis: Care Instructions. \"  Current as of: March 3, 2017  Content Version: 11.4  © 2675-5108 [employer]. Care instructions adapted under license by [employer] (which disclaims liability or warranty for this information). If you have questions about a medical condition or this instruction, always ask your healthcare professional. [address] any warranty or liability for your use of this information.

## 2017-12-19 NOTE — ED NOTES
I have reviewed discharge instructions with the patient. The patient verbalized understanding. Patient left ED via Discharge Method: ambulatory to Home with (wife). Opportunity for questions and clarification provided. Patient given 4 scripts. To continue your aftercare when you leave the hospital, you may receive an automated call from our care team to check in on how you are doing. This is a free service and part of our promise to provide the best care and service to meet your aftercare needs.  If you have questions, or wish to unsubscribe from this service please call [phone]. Thank you for Choosing our [facility].

## 2017-12-19 NOTE — ED PROVIDER NOTES
Patient is a 28 y.o. male presenting with vomiting. The history is provided by the patient. Vomiting    This is a new problem. The current episode started 12 to 24 hours ago. The problem occurs continuously. The problem has not changed since onset. The emesis has an appearance of stomach contents. There has been no fever. Associated symptoms include abdominal pain and diarrhea. Pertinent negatives include no chills, no fever, no headaches, no arthralgias, no cough, no URI and no headaches. The patient is not pregnant. His pertinent negatives include no DM. Past Medical History:   Diagnosis Date    FH: CAD (coronary artery disease)     GERD (gastroesophageal reflux disease)     HTN (hypertension) 10/31/2014    Hypercholesteremia     Hypertension     Sinus problem        History reviewed. No pertinent surgical history. Family History:   Problem Relation Age of Onset    Hypertension Mother     High Cholesterol Mother     Depression Mother     Substance Abuse Mother     Hypertension Father     Depression Father        Social History     Social History    Marital status:      Spouse name: N/A    Number of children: N/A    Years of education: N/A     Occupational History    Not on file. Social History Main Topics    Smoking status: Never Smoker    Smokeless tobacco: Never Used    Alcohol use No    Drug use: No    Sexual activity: Yes     Partners: Female     Other Topics Concern    Not on file     Social History Narrative         ALLERGIES: Review of patient's allergies indicates no known allergies. Review of Systems   Constitutional: Negative for chills and fever. HENT: Negative for rhinorrhea and sore throat. Eyes: Negative for discharge and redness. Respiratory: Negative for cough and shortness of breath. Cardiovascular: Negative for chest pain and palpitations. Gastrointestinal: Positive for abdominal pain, diarrhea, nausea and vomiting.    Genitourinary: Negative for difficulty urinating and dysuria. Musculoskeletal: Negative for arthralgias and back pain. Skin: Negative for rash. Neurological: Negative for dizziness and headaches. All other systems reviewed and are negative. Vitals:    12/18/17 1820   BP: 122/79   Pulse: (!) 106   Resp: 16   Temp: 99.6 °F (37.6 °C)   SpO2: 97%   Weight: 97.1 kg (214 lb)   Height: 5' 8\" (1.727 m)            Physical Exam   Constitutional: He is oriented to person, place, and time. He appears well-developed and well-nourished. No distress. HENT:   Head: Normocephalic and atraumatic. Eyes: Conjunctivae are normal. Pupils are equal, round, and reactive to light. Right eye exhibits no discharge. Left eye exhibits no discharge. No scleral icterus. Neck: Normal range of motion. Neck supple. Cardiovascular: Normal rate, regular rhythm and normal heart sounds. Exam reveals no gallop. No murmur heard. Pulmonary/Chest: Effort normal and breath sounds normal. No respiratory distress. He has no wheezes. He has no rales. Abdominal: Soft. Bowel sounds are normal. There is no tenderness. There is no guarding. Musculoskeletal: Normal range of motion. He exhibits no edema. Neurological: He is alert and oriented to person, place, and time. He exhibits normal muscle tone. cni 2-12 grossly   Skin: Skin is warm and dry. He is not diaphoretic. Psychiatric: He has a normal mood and affect. His behavior is normal.   Nursing note and vitals reviewed. MDM  Number of Diagnoses or Management Options  Diagnosis management comments: Medical decision making note:  \"continuous\" nausea vomiting diarrhea since last night  Mild abdominal pain onset after the vomiting  Tried Zofran at home to no avail  Check labs, administer fluids antiemetics and antidiarrheals and antispasmodics  Treatment with by mouth challenge afterwards.   This concludes the \"medical decision making note\" part of this emergency department visit note.      ED Course       Procedures

## 2017-12-20 ENCOUNTER — PATIENT OUTREACH (OUTPATIENT)
Dept: OTHER | Age: 27
End: 2017-12-20

## 2017-12-22 ENCOUNTER — PATIENT OUTREACH (OUTPATIENT)
Dept: OTHER | Age: 27
End: 2017-12-22

## 2017-12-22 NOTE — PROGRESS NOTES
Second attempt to reach patient for [facility] Program, and discharge assessment. Discreet VM left. Will send UTR letter.

## 2017-12-22 NOTE — LETTER
Mr. Zunilda Barillas. 
[address] [employer] [place] [address] Dear  Nino Kaden, My name is Nora Noland RN, Employee Care Manager for [employer], and I have been trying to reach you. The Employee Care  is a free-of-charge, confidential service provided to our employees and their family members covered by the [employer]. Part of my job is to follow up with members who have recently been in the hospital or emergency room, to help them coordinate their care and answer questions they may have about their visit. I am able to provide assistance with medication questions, scheduling needed follow-up appointments, and arranging services like home health or home medical equipment. I can also provide education regarding your hospital or ER visit as well as your medical conditions. As healthcare providers, we know that patients do better when they have close follow up with a primary care provider (PCP), especially after a hospital or emergency department visit. If you do not have a PCP, I can help you find one that is convenient to you and covered by your insurance. I can also help you understand any after visit instructions, such as what symptoms to watch out for, or any new or changed medications. Remember that you can access your After Visit Summary by logging into your [employer] account. If you do not have a [employer] account, I can help you request access. Our program is designed to provide you with the opportunity to have a [employer] care manager partner with you for your healthcare needs. Please contact me at the below number if I can provide you with assistance for any of the above services. Sincerely, Nora Noland RN, BSN  Employee Care Manager [email or web address]

## 2018-01-22 ENCOUNTER — PATIENT OUTREACH (OUTPATIENT)
Dept: OTHER | Age: 28
End: 2018-01-22

## 2018-01-22 NOTE — LETTER
Mr. Alva Mohr. 
[address] [employer] [place] [address] Dear Mr. Alva Mohr., 
 
My name is Gatito Davis RN, Employee Care Manager for [employer], and I have been trying to reach you. The Employee Care Management program is a free-of-charge, confidential service provided to our employees and their family members covered by the [employer]. Part of my job is to follow up with members who have recently been in the hospital or emergency room, to help them coordinate their care and answer questions they may have about their visit. As healthcare providers, we know that patients do better when they have close follow up with a primary care provider (PCP), especially after a hospital or emergency department visit. If you do not have a PCP, I can help you find one that is convenient to you and covered by your insurance. I can also help you understand any after visit instructions, such as what symptoms to watch out for, or any new or changed medications. Remember that you can access your After Visit Summary by logging into your [employer] account. If you do not have a [employer] account, I can help you request access. Our program is designed to provide you with the opportunity to have a [employer] care manager partner with you for your healthcare needs. Please contact me at the below number if I can provide you with assistance for any of the above services. Sincerely, Gatito Davis RN, BSN  Employee Care Manager [email or web address]

## (undated) DEVICE — MEDI-VAC YANKAUER SUCTION HANDLE W/BULBOUS TIP: Brand: CARDINAL HEALTH

## (undated) DEVICE — SPONGE: SPECIALTY TONSIL XR MED 100/CS: Brand: MEDICAL ACTION INDUSTRIES

## (undated) DEVICE — SOL ANTI-FOG 6ML MEDC -- MEDICHOICE - CONVERT TO 358427

## (undated) DEVICE — ELECTROSURGICAL SUCTION COAGULATOR 10FR

## (undated) DEVICE — STERILE LATEX POWDER-FREE SURGICAL GLOVESWITH NITRILE COATING: Brand: PROTEXIS

## (undated) DEVICE — T AND A ORAL: Brand: MEDLINE INDUSTRIES, INC.